# Patient Record
Sex: FEMALE | Race: OTHER | Employment: FULL TIME | URBAN - METROPOLITAN AREA
[De-identification: names, ages, dates, MRNs, and addresses within clinical notes are randomized per-mention and may not be internally consistent; named-entity substitution may affect disease eponyms.]

---

## 2017-11-24 ENCOUNTER — HOSPITAL ENCOUNTER (OUTPATIENT)
Dept: LAB | Age: 52
Discharge: HOME | End: 2017-11-24
Payer: COMMERCIAL

## 2017-11-24 LAB
ALBUMIN SERPL-MCNC: 3.7 G/DL (ref 3.4–5)
ALBUMIN/GLOB SERPL: 1.2 {RATIO} (ref 0.8–1.7)
ALP SERPL-CCNC: 108 U/L (ref 45–117)
ALT SERPL-CCNC: 30 U/L (ref 13–56)
ANION GAP SERPL CALC-SCNC: 7 MMOL/L (ref 3–18)
AST SERPL-CCNC: 17 U/L (ref 15–37)
BASOPHILS # BLD: 0 K/UL (ref 0–0.06)
BASOPHILS NFR BLD: 1 % (ref 0–2)
BILIRUB SERPL-MCNC: 0.3 MG/DL (ref 0.2–1)
BUN SERPL-MCNC: 15 MG/DL (ref 7–18)
BUN/CREAT SERPL: 19 (ref 12–20)
CALCIUM SERPL-MCNC: 9.2 MG/DL (ref 8.5–10.1)
CHLORIDE SERPL-SCNC: 103 MMOL/L (ref 100–108)
CHOLEST SERPL-MCNC: 162 MG/DL
CO2 SERPL-SCNC: 28 MMOL/L (ref 21–32)
CREAT SERPL-MCNC: 0.78 MG/DL (ref 0.6–1.3)
DIFFERENTIAL METHOD BLD: ABNORMAL
EOSINOPHIL # BLD: 0.1 K/UL (ref 0–0.4)
EOSINOPHIL NFR BLD: 1 % (ref 0–5)
ERYTHROCYTE [DISTWIDTH] IN BLOOD BY AUTOMATED COUNT: 13.9 % (ref 11.6–14.5)
GLOBULIN SER CALC-MCNC: 3.2 G/DL (ref 2–4)
GLUCOSE SERPL-MCNC: 94 MG/DL (ref 74–99)
HCT VFR BLD AUTO: 35.7 % (ref 35–45)
HDLC SERPL-MCNC: 57 MG/DL (ref 40–60)
HDLC SERPL: 2.8 {RATIO} (ref 0–5)
HGB BLD-MCNC: 11.8 G/DL (ref 12–16)
LDLC SERPL CALC-MCNC: 81.4 MG/DL (ref 0–100)
LIPID PROFILE,FLP: NORMAL
LYMPHOCYTES # BLD: 1.6 K/UL (ref 0.9–3.6)
LYMPHOCYTES NFR BLD: 25 % (ref 21–52)
MCH RBC QN AUTO: 27.6 PG (ref 24–34)
MCHC RBC AUTO-ENTMCNC: 33.1 G/DL (ref 31–37)
MCV RBC AUTO: 83.4 FL (ref 74–97)
MONOCYTES # BLD: 0.5 K/UL (ref 0.05–1.2)
MONOCYTES NFR BLD: 8 % (ref 3–10)
NEUTS SEG # BLD: 4.2 K/UL (ref 1.8–8)
NEUTS SEG NFR BLD: 65 % (ref 40–73)
PLATELET # BLD AUTO: 213 K/UL (ref 135–420)
PMV BLD AUTO: 9.9 FL (ref 9.2–11.8)
POTASSIUM SERPL-SCNC: 4 MMOL/L (ref 3.5–5.5)
PROT SERPL-MCNC: 6.9 G/DL (ref 6.4–8.2)
RBC # BLD AUTO: 4.28 M/UL (ref 4.2–5.3)
SODIUM SERPL-SCNC: 138 MMOL/L (ref 136–145)
TRIGL SERPL-MCNC: 118 MG/DL (ref ?–150)
VLDLC SERPL CALC-MCNC: 23.6 MG/DL
WBC # BLD AUTO: 6.4 K/UL (ref 4.6–13.2)

## 2017-11-24 PROCEDURE — 80061 LIPID PANEL: CPT | Performed by: FAMILY MEDICINE

## 2017-11-24 PROCEDURE — 84443 ASSAY THYROID STIM HORMONE: CPT | Performed by: FAMILY MEDICINE

## 2017-11-24 PROCEDURE — 80053 COMPREHEN METABOLIC PANEL: CPT | Performed by: FAMILY MEDICINE

## 2017-11-24 PROCEDURE — 36415 COLL VENOUS BLD VENIPUNCTURE: CPT | Performed by: FAMILY MEDICINE

## 2017-11-24 PROCEDURE — 85025 COMPLETE CBC W/AUTO DIFF WBC: CPT | Performed by: FAMILY MEDICINE

## 2017-11-28 LAB
FAX TO INFO,FAXT: NORMAL
FAX TO NUMBER,FAXN: NORMAL
TSH SERPL DL<=0.05 MIU/L-ACNC: 0.62 UIU/ML (ref 0.36–3.74)

## 2018-11-17 ENCOUNTER — HOSPITAL ENCOUNTER (OUTPATIENT)
Dept: LAB | Age: 53
Discharge: HOME | End: 2018-11-17
Payer: COMMERCIAL

## 2018-11-17 LAB
ANION GAP SERPL CALC-SCNC: 10 MMOL/L (ref 3–18)
BASOPHILS # BLD: 0 K/UL (ref 0–0.1)
BASOPHILS NFR BLD: 0 % (ref 0–2)
BUN SERPL-MCNC: 14 MG/DL (ref 7–18)
BUN/CREAT SERPL: 18
CALCIUM SERPL-MCNC: 8.9 MG/DL (ref 8.5–10.1)
CHLORIDE SERPL-SCNC: 106 MMOL/L (ref 100–108)
CO2 SERPL-SCNC: 24 MMOL/L (ref 21–32)
CREAT SERPL-MCNC: 0.8 MG/DL (ref 0.6–1.3)
DIFFERENTIAL METHOD BLD: NORMAL
EOSINOPHIL # BLD: 0.1 K/UL (ref 0–0.4)
EOSINOPHIL NFR BLD: 1 % (ref 0–5)
ERYTHROCYTE [DISTWIDTH] IN BLOOD BY AUTOMATED COUNT: 13.7 % (ref 11.6–14.5)
GLUCOSE SERPL-MCNC: 78 MG/DL (ref 74–99)
HCT VFR BLD AUTO: 38.5 % (ref 35–45)
HGB BLD-MCNC: 12.5 G/DL (ref 12–16)
LYMPHOCYTES # BLD: 1.3 K/UL (ref 0.9–3.6)
LYMPHOCYTES NFR BLD: 24 % (ref 21–52)
MCH RBC QN AUTO: 26.9 PG (ref 24–34)
MCHC RBC AUTO-ENTMCNC: 32.5 G/DL (ref 31–37)
MCV RBC AUTO: 82.8 FL (ref 74–97)
MONOCYTES # BLD: 0.5 K/UL (ref 0.05–1.2)
MONOCYTES NFR BLD: 9 % (ref 3–10)
NEUTS SEG # BLD: 3.7 K/UL (ref 1.8–8)
NEUTS SEG NFR BLD: 66 % (ref 40–73)
PLATELET # BLD AUTO: 243 K/UL (ref 135–420)
PMV BLD AUTO: 10.1 FL (ref 9.2–11.8)
POTASSIUM SERPL-SCNC: 3.7 MMOL/L (ref 3.5–5.5)
RBC # BLD AUTO: 4.65 M/UL (ref 4.2–5.3)
SODIUM SERPL-SCNC: 140 MMOL/L (ref 136–145)
WBC # BLD AUTO: 5.5 K/UL (ref 4.6–13.2)

## 2018-11-17 PROCEDURE — 80048 BASIC METABOLIC PNL TOTAL CA: CPT

## 2018-11-17 PROCEDURE — 84443 ASSAY THYROID STIM HORMONE: CPT

## 2018-11-17 PROCEDURE — 82607 VITAMIN B-12: CPT

## 2018-11-17 PROCEDURE — 82306 VITAMIN D 25 HYDROXY: CPT

## 2018-11-17 PROCEDURE — 83516 IMMUNOASSAY NONANTIBODY: CPT

## 2018-11-17 PROCEDURE — 85025 COMPLETE CBC W/AUTO DIFF WBC: CPT

## 2018-11-17 PROCEDURE — 36415 COLL VENOUS BLD VENIPUNCTURE: CPT

## 2018-11-17 PROCEDURE — 84439 ASSAY OF FREE THYROXINE: CPT

## 2018-11-18 LAB
25(OH)D3 SERPL-MCNC: 12.6 NG/ML (ref 30–100)
FOLATE SERPL-MCNC: >20 NG/ML (ref 3.1–17.5)
T4 FREE SERPL-MCNC: 0.9 NG/DL (ref 0.7–1.5)
TSH SERPL DL<=0.05 MIU/L-ACNC: 0.6 UIU/ML (ref 0.36–3.74)
VIT B12 SERPL-MCNC: 372 PG/ML (ref 211–911)

## 2018-11-21 LAB
ENDOMYSIUM IGA SER QL: NEGATIVE
GLIADIN PEPTIDE IGA SER-ACNC: 5 UNITS (ref 0–19)
GLIADIN PEPTIDE IGG SER-ACNC: 2 UNITS (ref 0–19)
IGA SERPL-MCNC: 448 MG/DL (ref 87–352)
TTG IGA SER-ACNC: <2 U/ML (ref 0–3)
TTG IGG SER-ACNC: <2 U/ML (ref 0–5)

## 2020-06-12 ENCOUNTER — HOSPITAL ENCOUNTER (OUTPATIENT)
Dept: PREADMISSION TESTING | Age: 55
Discharge: HOME | End: 2020-06-12
Payer: COMMERCIAL

## 2020-06-12 LAB
HCT VFR BLD AUTO: 36.6 % (ref 35–45)
HGB BLD-MCNC: 11.4 G/DL (ref 12–16)

## 2020-06-12 PROCEDURE — 36415 COLL VENOUS BLD VENIPUNCTURE: CPT

## 2020-06-12 PROCEDURE — 87635 SARS-COV-2 COVID-19 AMP PRB: CPT

## 2020-06-12 PROCEDURE — 85018 HEMOGLOBIN: CPT

## 2020-06-13 LAB — SARS-COV-2, COV2NT: NOT DETECTED

## 2020-06-17 NOTE — H&P (VIEW-ONLY)
Urology [address] [facility] [id number] Tel: [phone] Fax: [phone] Patient: Bryce Zimmerman YOB: 1965 Date: 05/27/2020 3:20 PM  
Visit Type: Consult Assessment/Plan # Detail Type Description 1. Assessment Urge incontinence (N39.41). Patient Plan Pt with significant urge incontinence issues, she has tried all medications with no improvement. She will undergo Axonics first stage implant in OR. All risks including pain infection bleeding need for 2nd stage procedure reviewed she understands and agrees 2. Assessment Urgency of urination (R39.15). 3. Assessment Frequency of micturition (R35.0). 4. Other Orders Orders not associated to today's assessments. Plan Orders The patient had the following order(s) completed today: UA Microscopic. Obtained on 05/27/2020, Interpretation: See module. This 50year old female presents for Overactive Bladder. History of Present Illness: 1. Overactive Bladder Onset was gradual. Severity level is moderate-severe. It occurs intermittently. The problem is worse. Associated symptoms include pelvic pain, pelvic pressure, urgency, urinary incontinence (mild LIDIA) and urinary frequency. Pertinent negatives include chills, constipation and fever. Additional information: Pt with a hx of urinary problems. I treated her with Myrbetriq 25 and was doing well. She is having worening of her symptoms. She is using 50mg and still having problems. She underwent  urodynamics revealing mixed incontinence. Her overactive bladder symptoms or worse then her stress incontinence. She is here today to undergo PNE.  
  
5/27/2020 Pt with hx of OAB and urge incontinence, she has undergone PNE previously, she had alot of sciatic pain.   I reviewed options and recommended she undergo first stage Axonics in OR and then we can determine if she has sciatica from implant. She is currently using Enablex and Oxytrol patches but they are ineffective PAST MEDICAL/SURGICAL HISTORY   (Detailed) Disease/disorder Onset Date Management Date Comments Foot Surgery 2016 Hysterectomy 2007 Laparoscopy 2004  section    
  bladder surgery GERD Esophagogastroduodenoscopy with possible biopsies and polypectomies as needed Thyroid disease Gastroparesis (NGES ) SIBO      
gastro-duodenitis at EGD Elevated lipids      
vit D defic GYNECOLOGIC HISTORY: 
Patient is postmenopausal.  
Type of menopause is hysterectomy . PROBLEM LIST:   Problem List reviewed. 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  
omeprazole 40 mg capsule,delayed release take 1 capsule by oral route  every day before a meal 2019 N  
NP Thyroid 60 mg tablet take 1 Tablet by Oral route  every day 2019 N Neurontin 300 mg capsule take 1 capsule by ORAL route 2 times every day 2019 N  
valacyclovir 500 mg tablet take 1 tablet by oral route  every day 12/10/2019   N  
hydrocodone 10 mg-chlorpheniramine 8 mg/5 mL oral susp extend. rel 12hr take 5 milliliter by oral route  every 12 hours 2020   N  
estradiol 0.0375 mg/24 hr weekly transdermal patch apply 1 patch by transdermal route  every week 2020  N  
omeprazole 40 mg capsule,delayed release take 1 capsule by oral route  every day before a meal 2020   N  
ergocalciferol (vitamin D2) 1,250 mcg (50,000 unit) capsule take 1 capsule by oral route  every week 2020   N Suprep Bowel Prep Kit 17.5 gram-3.13 gram-1.6 gram oral solution take the evening before and the morning of procedure as directed. 2020  N  
simvastatin 20 mg tablet TAKE ONE TABLET BY MOUTH EVERY DAY IN THE EVENING 2020 N  
metronidazole 500 mg tablet take 1 tablet by ORAL route 3 times every day 2020  N  
hydroxyzine HCl 50 mg tablet take 1 tablet by oral route 3 times every day 2020   N  
triamcinolone acetonide 0.1 % topical cream apply by TOPICAL route 3 times every day a thin film to the affected skin areas 2020   N Medication Reconciliation Medications reconciled today. Allergies Ingredient Reaction (Severity) Medication Name Comment CLINDAMYCIN     
CODEINE Unknown Reviewed, no changes. Family History  (Detailed) Relationship Family Member Name  Age at Death Condition Onset Age Cause of Death Maternal grandmother    Cancer, cervical  N Mother  N  Alive and well  N Mother    Alcoholism  N Social History:  (Detailed) Tobacco use reviewed. Preferred language is [place]. Born in [place]. MARITAL STATUS/FAMILY/SOCIAL SUPPORT Marital status: Single Tobacco use status: Never smoked tobacco. 
Smoking status: Never smoker. TOBACCO SCREENING: 
Patient has never used tobacco. Patient has not used tobacco in the last 30 days. Patient has not used smokeless tobacco in the last 30 days. SMOKING STATUS Type Smoking Status Usage Per Day Years Used Pack Years Total Pack Years Never smoker TOBACCO/VAPING EXPOSURE No passive smoke exposure. ALCOHOL There is no history of alcohol use. CAFFEINE The patient uses caffeine: coffee - 2 cups a day. LIFESTYLE 
Moderate activity level. Exercises 3-4 times a week. Review of Systems System Neg/Pos Details Constitutional Negative Chills and Fever. ENMT Negative Ear infections and Sore throat. Eyes Negative Blurred vision, Double vision and Eye pain. Respiratory Negative Asthma, Chronic cough, Dyspnea and Wheezing. Cardio Negative Chest pain. GI Negative Constipation, Decreased appetite, Diarrhea, Nausea and Vomiting. Endocrine Negative Cold intolerance, Heat intolerance, Increased thirst and Weight loss. Neuro Negative Headache and Tremors. Psych Negative Anxiety and Depression. Integumentary Negative Itching skin and Rash. MS Negative Back pain and Joint pain. Hema/Lymph Negative Easy bleeding. Vital Signs Gynecologic History Patient is postmenopausal.   
Height Time ft in cm Last Measured Height Position 3:41 PM 5.0 5.00 165.10 03/09/2020 0 Weight/BSA/BMI Time lb oz kg Context BMI kg/m2 BSA m2  
3:41 .00  71.668  26.29 Measured By Time Measured by  
3:41 PM [facility] Physical Exam 
Exam Findings Details Constitutional Normal No acute distress. Well nourished. Well developed. Head/Face Normal Facial features - Normal.  
Eyes Normal General - Right: Normal, Left: Normal.  
Neck Exam Normal Inspection - Normal.  
Respiratory Normal Inspection - Normal. Effort - Normal.  
Abdomen Normal Inspection - Normal. Appliance(s) - None. CVA tenderness - None. No abdominal tenderness. No hepatic enlargement. No spleen enlargement. No hernia. Genitourinary * Pelvic deferred. Genitourinary Normal No Suprapubic tenderness. No CVA tenderness. No Flank mass Musculoskeletal Normal Visual overview of all four extremities is normal. Gait - Normal.  
Extremity Normal No Edema. Neurological Normal Level of consciousness - Normal. Orientation - Normal. Memory - Normal.  
Psychiatric Normal Orientation - Oriented to time, place, person & situation. No agitation. Not anxious. Appropriate mood and affect. Immunizations Entered by History Date Immunization 11/9/2009 9:54:00 AM Flu Shot Medications (added, continued, or stopped today) Start Date Medication Directions PRN Status PRN Reason Instruction Stop Date  
03/09/2020 ergocalciferol (vitamin D2) 1,250 mcg (50,000 unit) capsule take 1 capsule by oral route  every week N     
02/03/2020 estradiol 0.0375 mg/24 hr weekly transdermal patch apply 1 patch by transdermal route  every week N   05/27/2020 01/02/2020 hydrocodone 10 mg-chlorpheniramine 8 mg/5 mL oral susp extend. rel 12hr take 5 milliliter by oral route  every 12 hours N     
05/28/2020 hydroquinone 4 % topical cream apply by topical route 2 times every day to the affected area(s) in the morning and at bedtime N     
04/06/2020 hydroxyzine HCl 50 mg tablet take 1 tablet by oral route 3 times every day N     
03/09/2020 metronidazole 500 mg tablet take 1 tablet by ORAL route 3 times every day N   05/27/2020 07/03/2019 Neurontin 300 mg capsule take 1 capsule by ORAL route 2 times every day N     
07/03/2019 NP Thyroid 60 mg tablet take 1 Tablet by Oral route  every day N     
07/03/2019 omeprazole 40 mg capsule,delayed release take 1 capsule by oral route  every day before a meal N     
02/27/2020 omeprazole 40 mg capsule,delayed release take 1 capsule by oral route  every day before a meal N     
03/09/2020 simvastatin 20 mg tablet TAKE ONE TABLET BY MOUTH EVERY DAY IN THE EVENING N   03/25/2021 03/09/2020 Suprep Bowel Prep Kit 17.5 gram-3.13 gram-1.6 gram oral solution take the evening before and the morning of procedure as directed. N   05/27/2020 04/06/2020 triamcinolone acetonide 0.1 % topical cream apply by TOPICAL route 3 times every day a thin film to the affected skin areas N     
12/10/2019 valacyclovir 500 mg tablet take 1 tablet by oral route  every day N Active Patient Care Team Members Name Contact Agency Type Support Role Relationship Active Date Inactive Date Specialty Ashu Franklin   encounter provider    Pulmonary Medicine Cailin Murillo   Patient provider PCP   Family Practice Cailin Murillo   primary care provider Huang Al   encounter provider Orquidea Packer   encounter provider    Gastroenterology Dhaval Orr   encounter provider    Urology Provider: Huang Al MD 05/27/2020 03:20 PM 
Document generated by:  Vanesa Chicas 06/02/2020 07:06 AM 
 
 
 
Electronically signed by Huang Al MD on 06/03/2020 07:35 PM

## 2020-06-17 NOTE — H&P
Urology [address] [place] [address]  Tel: [phone]  Fax: [phone]      Patient: Cyndee Camargo  YOB: 1965  Date: 05/27/2020 3:20 PM   Visit Type: Consult        Assessment/Plan  # Detail Type Description    1. Assessment Urge incontinence (N39.41). Patient Plan Pt with significant urge incontinence issues, she has tried all medications with no improvement. She will undergo Axonics first stage implant in OR. All risks including pain infection bleeding need for 2nd stage procedure reviewed she understands and agrees         2. Assessment Urgency of urination (R39.15). 3. Assessment Frequency of micturition (R35.0). 4. Other Orders Orders not associated to today's assessments. Plan Orders The patient had the following order(s) completed today: UA Microscopic. Obtained on 05/27/2020, Interpretation: See module. This 50year old female presents for Overactive Bladder. History of Present Illness:  1. Overactive Bladder   Onset was gradual. Severity level is moderate-severe. It occurs intermittently. The problem is worse. Associated symptoms include pelvic pain, pelvic pressure, urgency, urinary incontinence (mild LIDIA) and urinary frequency. Pertinent negatives include chills, constipation and fever. Additional information: Pt with a hx of urinary problems. I treated her with Myrbetriq 25 and was doing well. She is having worening of her symptoms. She is using 50mg and still having problems. She underwent  urodynamics revealing mixed incontinence. Her overactive bladder symptoms or worse then her stress incontinence. She is here today to undergo PNE.      5/27/2020  Pt with hx of OAB and urge incontinence, she has undergone PNE previously, she had alot of sciatic pain. I reviewed options and recommended she undergo first stage Axonics in OR and then we can determine if she has sciatica from implant.  She is currently using Enablex and Oxytrol patches but they are ineffective          PAST MEDICAL/SURGICAL HISTORY   (Detailed)    Disease/disorder Onset Date Management Date Comments     Foot Surgery 2016      Hysterectomy 2007      Laparoscopy 2004       section       bladder surgery     GERD         Esophagogastroduodenoscopy with possible biopsies and polypectomies as needed     Thyroid disease       Gastroparesis (NGES )       SIBO       gastro-duodenitis at EGD       Elevated lipids       vit D defic         GYNECOLOGIC HISTORY:  Patient is postmenopausal.   Type of menopause is hysterectomy . PROBLEM LIST:   Problem List reviewed.    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     Intestinal malabsorption 03/10/2020 N     Gastroesophageal reflux in child 03/10/2020 N     Abdominal bloating 03/10/2020 N     Erosive gastritis 03/10/2020 N     Central abdominal pain 03/10/2020 N     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 rel 12hr take 5 milliliter by oral route  every 12 hours 2020   N   estradiol 0.0375 mg/24 hr weekly transdermal patch apply 1 patch by transdermal route  every week 2020  N   omeprazole 40 mg capsule,delayed release take 1 capsule by oral route  every day before a meal 2020   N   ergocalciferol (vitamin D2) 1,250 mcg (50,000 unit) capsule take 1 capsule by oral route  every week 2020   N   Suprep Bowel Prep Kit 17.5 gram-3.13 gram-1.6 gram oral solution take the evening before and the morning of procedure as directed. 2020  N   simvastatin 20 mg tablet TAKE ONE TABLET BY MOUTH EVERY DAY IN THE EVENING 2020 N   metronidazole 500 mg tablet take 1 tablet by ORAL route 3 times every day 2020  N   hydroxyzine HCl 50 mg tablet take 1 tablet by oral route 3 times every day 2020   N   triamcinolone acetonide 0.1 % topical cream apply by TOPICAL route 3 times every day a thin film to the affected skin areas 2020   N     Medication Reconciliation  Medications reconciled today. Allergies  Ingredient Reaction (Severity) Medication Name Comment   CLINDAMYCIN      CODEINE Unknown       Reviewed, no changes. Family History  (Detailed)  Relationship Family Member Name  Age at Death Condition Onset Age Cause of Death   Maternal grandmother    Cancer, cervical  N   Mother  N  Alive and well  N   Mother    Alcoholism  N         Social History:  (Detailed)  Tobacco use reviewed. Preferred language is [place]. Born in [place]. MARITAL STATUS/FAMILY/SOCIAL SUPPORT  Marital status: Single   Tobacco use status: Never smoked tobacco.  Smoking status: Never smoker. TOBACCO SCREENING:  Patient has never used tobacco. Patient has not used tobacco in the last 30 days. Patient has not used smokeless tobacco in the last 30 days.     SMOKING STATUS  Type Smoking Status Usage Per Day Years Used Pack Years Total Pack Years    Never smoker         TOBACCO/VAPING EXPOSURE  No passive smoke exposure. ALCOHOL  There is no history of alcohol use. CAFFEINE  The patient uses caffeine: coffee - 2 cups a day. LIFESTYLE  Moderate activity level. Exercises 3-4 times a week. Review of Systems  System Neg/Pos Details   Constitutional Negative Chills and Fever. ENMT Negative Ear infections and Sore throat. Eyes Negative Blurred vision, Double vision and Eye pain. Respiratory Negative Asthma, Chronic cough, Dyspnea and Wheezing. Cardio Negative Chest pain. GI Negative Constipation, Decreased appetite, Diarrhea, Nausea and Vomiting. Endocrine Negative Cold intolerance, Heat intolerance, Increased thirst and Weight loss. Neuro Negative Headache and Tremors. Psych Negative Anxiety and Depression. Integumentary Negative Itching skin and Rash. MS Negative Back pain and Joint pain. Hema/Lymph Negative Easy bleeding. Vital Signs   Gynecologic History  Patient is postmenopausal.    Height  Time ft in cm Last Measured Height Position   3:41 PM 5.0 5.00 165.10 03/09/2020 0   Weight/BSA/BMI  Time lb oz kg Context BMI kg/m2 BSA m2   3:41 .00  71.668  26.29    Measured By  Time Measured by   3:41 PM [facility]     Physical Exam  Exam Findings Details   Constitutional Normal No acute distress. Well nourished. Well developed. Head/Face Normal Facial features - Normal.   Eyes Normal General - Right: Normal, Left: Normal.   Neck Exam Normal Inspection - Normal.   Respiratory Normal Inspection - Normal. Effort - Normal.   Abdomen Normal Inspection - Normal. Appliance(s) - None. CVA tenderness - None. No abdominal tenderness. No hepatic enlargement. No spleen enlargement. No hernia. Genitourinary * Pelvic deferred. Genitourinary Normal No Suprapubic tenderness. No CVA tenderness.  No Flank mass   Musculoskeletal Normal Visual overview of all four extremities is normal. Gait - Normal.   Extremity Normal No Edema. Neurological Normal Level of consciousness - Normal. Orientation - Normal. Memory - Normal.   Psychiatric Normal Orientation - Oriented to time, place, person & situation. No agitation. Not anxious. Appropriate mood and affect. Immunizations Entered by History    Date Immunization   11/9/2009 9:54:00 AM Flu Shot       Medications (added, continued, or stopped today)  Start Date Medication Directions PRN Status PRN Reason Instruction Stop Date   03/09/2020 ergocalciferol (vitamin D2) 1,250 mcg (50,000 unit) capsule take 1 capsule by oral route  every week N      02/03/2020 estradiol 0.0375 mg/24 hr weekly transdermal patch apply 1 patch by transdermal route  every week N   05/27/2020 01/02/2020 hydrocodone 10 mg-chlorpheniramine 8 mg/5 mL oral susp extend. rel 12hr take 5 milliliter by oral route  every 12 hours N      05/28/2020 hydroquinone 4 % topical cream apply by topical route 2 times every day to the affected area(s) in the morning and at bedtime N      04/06/2020 hydroxyzine HCl 50 mg tablet take 1 tablet by oral route 3 times every day N      03/09/2020 metronidazole 500 mg tablet take 1 tablet by ORAL route 3 times every day N   05/27/2020 07/03/2019 Neurontin 300 mg capsule take 1 capsule by ORAL route 2 times every day N      07/03/2019 NP Thyroid 60 mg tablet take 1 Tablet by Oral route  every day N      07/03/2019 omeprazole 40 mg capsule,delayed release take 1 capsule by oral route  every day before a meal N      02/27/2020 omeprazole 40 mg capsule,delayed release take 1 capsule by oral route  every day before a meal N      03/09/2020 simvastatin 20 mg tablet TAKE ONE TABLET BY MOUTH EVERY DAY IN THE EVENING N   03/25/2021 03/09/2020 Suprep Bowel Prep Kit 17.5 gram-3.13 gram-1.6 gram oral solution take the evening before and the morning of procedure as directed.  N   05/27/2020 04/06/2020 triamcinolone acetonide 0.1 % topical cream apply by TOPICAL route 3 times every day a thin film to the affected skin areas N      12/10/2019 valacyclovir 500 mg tablet take 1 tablet by oral route  every day N      Active Patient Care Team Members    Name Contact Agency Type Support Role Relationship Active Date Inactive Date Specialty   Vern Bound   encounter provider    Pulmonary Medicine   Cailin Murillo   Patient provider PCP   Family Practice   Cailin Murillo   primary care provider       Mallorie Vinson   encounter provider       Buster Maher   encounter provider    Gastroenterology   Catrina Peterson   encounter provider    Urology       Provider: Mallorie Vinson MD 05/27/2020 03:20 PM  Document generated by:  Bry Chicas 06/02/2020 07:06 AM        Electronically signed by Mallorie Vinson MD on 06/03/2020 07:35 PM

## 2020-06-18 ENCOUNTER — ANESTHESIA EVENT (OUTPATIENT)
Dept: SURGERY | Age: 55
End: 2020-06-18
Payer: COMMERCIAL

## 2020-06-18 ENCOUNTER — APPOINTMENT (OUTPATIENT)
Dept: GENERAL RADIOLOGY | Age: 55
End: 2020-06-18
Attending: UROLOGY
Payer: COMMERCIAL

## 2020-06-18 ENCOUNTER — HOSPITAL ENCOUNTER (OUTPATIENT)
Age: 55
Setting detail: OUTPATIENT SURGERY
Discharge: HOME | End: 2020-06-18
Attending: UROLOGY | Admitting: UROLOGY
Payer: COMMERCIAL

## 2020-06-18 ENCOUNTER — ANESTHESIA (OUTPATIENT)
Dept: SURGERY | Age: 55
End: 2020-06-18
Payer: COMMERCIAL

## 2020-06-18 VITALS
DIASTOLIC BLOOD PRESSURE: 85 MMHG | TEMPERATURE: 97.4 F | OXYGEN SATURATION: 100 % | BODY MASS INDEX: 27.54 KG/M2 | SYSTOLIC BLOOD PRESSURE: 169 MMHG | WEIGHT: 165.31 LBS | HEART RATE: 70 BPM | RESPIRATION RATE: 15 BRPM | HEIGHT: 65 IN

## 2020-06-18 PROCEDURE — 74011000250 HC RX REV CODE- 250: Performed by: UROLOGY

## 2020-06-18 PROCEDURE — 77030018842 HC SOL IRR SOD CL 9% BAXT -A: Performed by: UROLOGY

## 2020-06-18 PROCEDURE — 77030020268 HC MISC GENERAL SUPPLY: Performed by: UROLOGY

## 2020-06-18 PROCEDURE — 74011250636 HC RX REV CODE- 250/636: Performed by: UROLOGY

## 2020-06-18 PROCEDURE — 74011000250 HC RX REV CODE- 250: Performed by: ANESTHESIOLOGY

## 2020-06-18 PROCEDURE — 77030020782 HC GWN BAIR PAWS FLX 3M -B: Performed by: UROLOGY

## 2020-06-18 PROCEDURE — 74011000272 HC RX REV CODE- 272: Performed by: UROLOGY

## 2020-06-18 PROCEDURE — C1883 ADAPT/EXT, PACING/NEURO LEAD: HCPCS | Performed by: UROLOGY

## 2020-06-18 PROCEDURE — 76210000021 HC REC RM PH II 0.5 TO 1 HR: Performed by: UROLOGY

## 2020-06-18 PROCEDURE — 76060000034 HC ANESTHESIA 1.5 TO 2 HR: Performed by: UROLOGY

## 2020-06-18 PROCEDURE — 74011250636 HC RX REV CODE- 250/636: Performed by: ANESTHESIOLOGY

## 2020-06-18 PROCEDURE — 76010000153 HC OR TIME 1.5 TO 2 HR: Performed by: UROLOGY

## 2020-06-18 PROCEDURE — C1778 LEAD, NEUROSTIMULATOR: HCPCS | Performed by: UROLOGY

## 2020-06-18 PROCEDURE — 77030034475 HC MISC IMPL SPN: Performed by: UROLOGY

## 2020-06-18 PROCEDURE — 77030002888 HC SUT CHRMC J&J -A: Performed by: UROLOGY

## 2020-06-18 PROCEDURE — 77030031139 HC SUT VCRL2 J&J -A: Performed by: UROLOGY

## 2020-06-18 DEVICE — PERCUTANEOUS EXTENSION
Type: IMPLANTABLE DEVICE | Site: BACK | Status: FUNCTIONAL
Brand: AXONICS

## 2020-06-18 DEVICE — TINED LEAD KIT
Type: IMPLANTABLE DEVICE | Site: BACK | Status: FUNCTIONAL
Brand: AXONICS

## 2020-06-18 RX ORDER — CEFAZOLIN SODIUM 2 G/50ML
2 SOLUTION INTRAVENOUS ONCE
Status: COMPLETED | OUTPATIENT
Start: 2020-06-18 | End: 2020-06-18

## 2020-06-18 RX ORDER — LIDOCAINE HYDROCHLORIDE 20 MG/ML
INJECTION, SOLUTION EPIDURAL; INFILTRATION; INTRACAUDAL; PERINEURAL AS NEEDED
Status: DISCONTINUED | OUTPATIENT
Start: 2020-06-18 | End: 2020-06-18 | Stop reason: HOSPADM

## 2020-06-18 RX ORDER — BUPIVACAINE HYDROCHLORIDE 2.5 MG/ML
INJECTION, SOLUTION EPIDURAL; INFILTRATION; INTRACAUDAL AS NEEDED
Status: DISCONTINUED | OUTPATIENT
Start: 2020-06-18 | End: 2020-06-18 | Stop reason: HOSPADM

## 2020-06-18 RX ORDER — SODIUM CHLORIDE, SODIUM LACTATE, POTASSIUM CHLORIDE, CALCIUM CHLORIDE 600; 310; 30; 20 MG/100ML; MG/100ML; MG/100ML; MG/100ML
125 INJECTION, SOLUTION INTRAVENOUS CONTINUOUS
Status: DISCONTINUED | OUTPATIENT
Start: 2020-06-18 | End: 2020-06-18 | Stop reason: HOSPADM

## 2020-06-18 RX ORDER — FENTANYL CITRATE 50 UG/ML
INJECTION, SOLUTION INTRAMUSCULAR; INTRAVENOUS AS NEEDED
Status: DISCONTINUED | OUTPATIENT
Start: 2020-06-18 | End: 2020-06-18 | Stop reason: HOSPADM

## 2020-06-18 RX ORDER — METOCLOPRAMIDE HYDROCHLORIDE 5 MG/ML
INJECTION INTRAMUSCULAR; INTRAVENOUS AS NEEDED
Status: DISCONTINUED | OUTPATIENT
Start: 2020-06-18 | End: 2020-06-18 | Stop reason: HOSPADM

## 2020-06-18 RX ORDER — MIDAZOLAM HYDROCHLORIDE 1 MG/ML
INJECTION, SOLUTION INTRAMUSCULAR; INTRAVENOUS AS NEEDED
Status: DISCONTINUED | OUTPATIENT
Start: 2020-06-18 | End: 2020-06-18 | Stop reason: HOSPADM

## 2020-06-18 RX ORDER — PROPOFOL 10 MG/ML
INJECTION, EMULSION INTRAVENOUS
Status: DISCONTINUED | OUTPATIENT
Start: 2020-06-18 | End: 2020-06-18 | Stop reason: HOSPADM

## 2020-06-18 RX ORDER — LIDOCAINE HYDROCHLORIDE 10 MG/ML
INJECTION INFILTRATION; PERINEURAL AS NEEDED
Status: DISCONTINUED | OUTPATIENT
Start: 2020-06-18 | End: 2020-06-18 | Stop reason: HOSPADM

## 2020-06-18 RX ORDER — KETAMINE HYDROCHLORIDE 10 MG/ML
INJECTION, SOLUTION INTRAMUSCULAR; INTRAVENOUS AS NEEDED
Status: DISCONTINUED | OUTPATIENT
Start: 2020-06-18 | End: 2020-06-18 | Stop reason: HOSPADM

## 2020-06-18 RX ADMIN — CEFAZOLIN SODIUM 2 G: 2 SOLUTION INTRAVENOUS at 09:45

## 2020-06-18 RX ADMIN — SODIUM CHLORIDE, SODIUM LACTATE, POTASSIUM CHLORIDE, AND CALCIUM CHLORIDE: 600; 310; 30; 20 INJECTION, SOLUTION INTRAVENOUS at 09:31

## 2020-06-18 RX ADMIN — SODIUM CHLORIDE, SODIUM LACTATE, POTASSIUM CHLORIDE, AND CALCIUM CHLORIDE: 600; 310; 30; 20 INJECTION, SOLUTION INTRAVENOUS at 10:30

## 2020-06-18 RX ADMIN — LIDOCAINE HYDROCHLORIDE 60 MG: 20 INJECTION, SOLUTION EPIDURAL; INFILTRATION; INTRACAUDAL; PERINEURAL at 09:44

## 2020-06-18 RX ADMIN — PROPOFOL 75 MCG/KG/MIN: 10 INJECTION, EMULSION INTRAVENOUS at 09:48

## 2020-06-18 RX ADMIN — METOCLOPRAMIDE 10 MG: 5 INJECTION, SOLUTION INTRAMUSCULAR; INTRAVENOUS at 09:44

## 2020-06-18 RX ADMIN — KETAMINE HYDROCHLORIDE 10 MG: 10 INJECTION, SOLUTION INTRAMUSCULAR; INTRAVENOUS at 10:39

## 2020-06-18 RX ADMIN — SODIUM CHLORIDE, SODIUM LACTATE, POTASSIUM CHLORIDE, AND CALCIUM CHLORIDE 125 ML/HR: 600; 310; 30; 20 INJECTION, SOLUTION INTRAVENOUS at 08:10

## 2020-06-18 RX ADMIN — KETAMINE HYDROCHLORIDE 20 MG: 10 INJECTION, SOLUTION INTRAMUSCULAR; INTRAVENOUS at 09:41

## 2020-06-18 RX ADMIN — MIDAZOLAM 2 MG: 1 INJECTION INTRAMUSCULAR; INTRAVENOUS at 09:31

## 2020-06-18 RX ADMIN — FENTANYL CITRATE 100 MCG: 50 INJECTION, SOLUTION INTRAMUSCULAR; INTRAVENOUS at 09:39

## 2020-06-18 RX ADMIN — KETAMINE HYDROCHLORIDE 10 MG: 10 INJECTION, SOLUTION INTRAMUSCULAR; INTRAVENOUS at 10:43

## 2020-06-18 RX ADMIN — KETAMINE HYDROCHLORIDE 10 MG: 10 INJECTION, SOLUTION INTRAMUSCULAR; INTRAVENOUS at 09:51

## 2020-06-18 NOTE — INTERVAL H&P NOTE
Update History & Physical    The Patient's History and Physical of May 27,   2020 was reviewed with the patient and I examined the patient. There was no change. The surgical site was confirmed by the patient and me. Plan:  The risk, benefits, expected outcome, and alternative to the recommended procedure have been discussed with the patient. Patient understands and wants to proceed with the procedure.     Electronically signed by Joseluis Bernal MD on 6/18/2020 at 9:30 AM

## 2020-06-18 NOTE — ANESTHESIA PREPROCEDURE EVALUATION
Relevant Problems   No relevant active problems       Anesthetic History   No history of anesthetic complications            Review of Systems / Medical History  Patient summary reviewed and pertinent labs reviewed    Pulmonary  Within defined limits                 Neuro/Psych   Within defined limits           Cardiovascular  Within defined limits                Exercise tolerance: >4 METS     GI/Hepatic/Renal     GERD: well controlled        Pertinent negatives: No hiatal hernia  Comments: History of gastroparesis, tends to be episodic. None currently for a couple months. Reports no early satiation; hungry now.    Endo/Other      Hypothyroidism: well controlled       Other Findings            Physical Exam    Airway  Mallampati: II  TM Distance: 4 - 6 cm  Neck ROM: normal range of motion   Mouth opening: Normal     Cardiovascular    Rhythm: regular  Rate: normal         Dental  No notable dental hx       Pulmonary  Breath sounds clear to auscultation               Abdominal  GI exam deferred       Other Findings            Anesthetic Plan    ASA: 2  Anesthesia type: MAC          Induction: Intravenous  Anesthetic plan and risks discussed with: Patient

## 2020-06-18 NOTE — PERIOP NOTES
Discharge instructions reviewed with Daughter Candice Samuel via telephone. No questions or concerns at this time.

## 2020-06-18 NOTE — DISCHARGE INSTRUCTIONS
Selena Rodriguez. Erick Ferrell M.D. [facility]  [address], [address] [place], [address]  Office: [phone]  Fax:    [phone]: Procedure(s):  [address] Urology IMMEDIATELY if any of the following occur:     You are unable to urinate. Urgency to urinate is not uncommon.  You find yourself urinating small frequent amounts associated with severe lower abdominal discomfort.  Bright red blood with clots in the urine. Some reddish urine is not uncommon and should be treated with increasing the amount of fluids you drink.  Temperature above 101.5° and / or chills.  You are nauseous and / or vomiting and you cannot hold down any fluids.  Your pain is not controlled with the pain medication prescribed. Special Considerations:      Do not drive for at least 24 hours after the procedure and until you are no longer taking narcotic pain medication and you are able to move and react without hesitation. MEDICATIONS:  Pain   []  Norco®   []  Percocet® []  Dilaudid®    [x]  Tramadol   Antibiotics   []  Cipro   [x]  Keflex    [] Levaquin   []  Bactrim DS®       Urination   []  Vesicare®   []  Flomax     Burning   []  Pyridium®   []  UribelTM     Nausea   []  Zofran®   []  Phenergan®     Miscellaneous   []           [] Prescriptions Written on Chart    [x] Prescriptions sent Electronically           Our office will call you tomorrow to schedule your first follow-up appointment. Please contact [facility], [facility]. Urology at [phone] or go to the nearest Emergency Department / Urgent Care facility for any other medical questions or concerns. Patient armband removed and shredded  Learning About Coronavirus (COVID-19)  Coronavirus (COVID-19): Overview  What is coronavirus (MOPAV-90)? The coronavirus disease (COVID-19) is caused by a virus. It is an illness that was first found in [place], [place], in December 2019. It has since spread worldwide.   The virus can cause fever, cough, and trouble breathing. In severe cases, it can cause pneumonia and make it hard to breathe without help. It can cause death. Coronaviruses are a large group of viruses. They cause the common cold. They also cause more serious illnesses like Middle East respiratory syndrome (MERS) and severe acute respiratory syndrome (SARS). COVID-19 is caused by a novel coronavirus. That means it's a new type that has not been seen in people before. This virus spreads person-to-person through droplets from coughing and sneezing. It can also spread when you are close to someone who is infected. And it can spread when you touch something that has the virus on it, such as a doorknob or a tabletop. What can you do to protect yourself from coronavirus (COVID-19)? The best way to protect yourself from getting sick is to:  · Avoid areas where there is an outbreak. · Avoid contact with people who may be infected. · Wash your hands often with soap or alcohol-based hand sanitizers. · Avoid crowds and try to stay at least 6 feet away from other people. · Wash your hands often, especially after you cough or sneeze. Use soap and water, and scrub for at least 20 seconds. If soap and water aren't available, use an alcohol-based hand . · Avoid touching your mouth, nose, and eyes. What can you do to avoid spreading the virus to others? To help avoid spreading the virus to others:  · Cover your mouth with a tissue when you cough or sneeze. Then throw the tissue in the trash. · Use a disinfectant to clean things that you touch often. · Stay home if you are sick or have been exposed to the virus. Don't go to school, work, or public areas. And don't use public transportation. · If you are sick:  ? Leave your home only if you need to get medical care. But call the doctor's office first so they know you're coming.  And wear a face mask, if you have one.  ? If you have a face mask, wear it whenever you're around other people. It can help stop the spread of the virus when you cough or sneeze. ? Clean and disinfect your home every day. Use household  and disinfectant wipes or sprays. Take special care to clean things that you grab with your hands. These include doorknobs, remote controls, phones, and handles on your refrigerator and microwave. And don't forget countertops, tabletops, bathrooms, and computer keyboards. When to call for help  Call 911 anytime you think you may need emergency care. For example, call if:  · You have severe trouble breathing. (You can't talk at all.)  · You have constant chest pain or pressure. · You are severely dizzy or lightheaded. · You are confused or can't think clearly. · Your face and lips have a blue color. · You pass out (lose consciousness) or are very hard to wake up. Call your doctor now if you develop symptoms such as:  · Shortness of breath. · Fever. · Cough. If you need to get care, call ahead to the doctor's office for instructions before you go. Make sure you wear a face mask, if you have one, to prevent exposing other people to the virus. Where can you get the latest information? The following health organizations are tracking and studying this virus. Their websites contain the most up-to-date information. Elyse Ellington also learn what to do if you think you may have been exposed to the virus. · U.S. Centers for Disease Control and Prevention (CDC): The CDC provides updated news about the disease and travel advice. The website also tells you how to prevent the spread of infection. www.cdc.gov  · World Health Organization [facility]): WHO offers information about the virus outbreaks. WHO also has travel advice. www.who.int  Current as of: April 1, 2020               Content Version: 12.4  © 0455-0138 [employer], [employer]orporated.    Care instructions adapted under license by your healthcare professional. If you have questions about a medical condition or this instruction, always ask your healthcare professional. [place] [address] any warranty or liability for your use of this information. DISCHARGE SUMMARY from Nurse    PATIENT INSTRUCTIONS:    After general anesthesia or intravenous sedation, for 24 hours or while taking prescription Narcotics:  · Limit your activities  · Do not drive and operate hazardous machinery  · Do not make important personal or business decisions  · Do  not drink alcoholic beverages  · If you have not urinated within 8 hours after discharge, please contact your surgeon on call. Report the following to your surgeon:  · Excessive pain, swelling, redness or odor of or around the surgical area  · Temperature over 100.5  · Nausea and vomiting lasting longer than 4 hours or if unable to take medications  · Any signs of decreased circulation or nerve impairment to extremity: change in color, persistent  numbness, tingling, coldness or increase pain  · Any questions    What to do at Home:  Recommended activity: Activity as tolerated and no driving for today and Ambulate in house,     If you experience any of the following symptoms above, please follow up with Dr. Ramón Castillo. *  Please give a list of your current medications to your Primary Care Provider. *  Please update this list whenever your medications are discontinued, doses are      changed, or new medications (including over-the-counter products) are added. *  Please carry medication information at all times in case of emergency situations. These are general instructions for a healthy lifestyle:    No smoking/ No tobacco products/ Avoid exposure to second hand smoke  Surgeon General's Warning:  Quitting smoking now greatly reduces serious risk to your health.     Obesity, smoking, and sedentary lifestyle greatly increases your risk for illness    A healthy diet, regular physical exercise & weight monitoring are important for maintaining a healthy lifestyle    You may be retaining fluid if you have a history of heart failure or if you experience any of the following symptoms:  Weight gain of 3 pounds or more overnight or 5 pounds in a week, increased swelling in our hands or feet or shortness of breath while lying flat in bed. Please call your doctor as soon as you notice any of these symptoms; do not wait until your next office visit. The discharge information has been reviewed with the patient and caregiver. The patient and caregiver verbalized understanding. Discharge medications reviewed with the patient and caregiver and appropriate educational materials and side effects teaching were provided.   ___________________________________________________________________________________________________________________________________

## 2020-06-18 NOTE — PERIOP NOTES
Reviewed PTA medication list with patient/caregiver and patient/caregiver denies any additional medications. Patient admits to having a responsible adult care for them at home for at least 24 hours after surgery. Patient encouraged to use gown warming system and informed that using said warming gown to regulate body temperature prior to a procedure has been shown to help reduce the risks of blood clots and infection. Dual skin assessment & fall risk band verification completed with Cristino Shah RN.

## 2020-06-18 NOTE — ANESTHESIA POSTPROCEDURE EVALUATION
Procedure(s):  AXONICS I STAGE IMPLANT WITH C-ARM. MAC    Anesthesia Post Evaluation      Multimodal analgesia: multimodal analgesia used between 6 hours prior to anesthesia start to PACU discharge  Patient location during evaluation: PACU  Patient participation: complete - patient participated  Level of consciousness: awake  Pain management: adequate  Airway patency: patent  Anesthetic complications: no  Cardiovascular status: acceptable  Respiratory status: acceptable  Hydration status: acceptable  Post anesthesia nausea and vomiting:  none  Final Post Anesthesia Temperature Assessment:  Normothermia (36.0-37.5 degrees C)      INITIAL Post-op Vital signs:   Vitals Value Taken Time   /85 6/18/2020 11:48 AM   Temp     Pulse 70 6/18/2020 11:54 AM   Resp     SpO2 100 % 6/18/2020 11:54 AM   Vitals shown include unvalidated device data.

## 2020-06-18 NOTE — BRIEF OP NOTE
Brief Postoperative Note    Patient: Kevin Cunningham  YOB: 1965  MRN: [id number]    Date of Procedure: 6/18/2020     Pre-Op Diagnosis: URGE INCONTINENCE    Post-Op Diagnosis: Same as preoperative diagnosis. Procedure(s):  AXONICS I STAGE IMPLANT WITH C-ARM    Surgeon(s):  Erinn Acevedo MD    Surgical Assistant: None    Anesthesia: MAC     Estimated Blood Loss (mL): Minimal    Complications: None    Specimens: * No specimens in log *     Implants:   Implant Name Type Inv.  Item Serial No.  Lot No. LRB No. Used Action   AXONICS SNM SYSTEM TINED LEAD   [id number]   Right 1 Implanted   AXONICS SNM SYSTEM PERCUTANEOUS EXTENSION   [id number]   Right 1 Implanted       Drains: * No LDAs found *    Findings: Urge incontinence    Electronically Signed by Huang Al MD on 6/18/2020 at 10:58 AM

## 2020-06-19 ENCOUNTER — HOSPITAL ENCOUNTER (OUTPATIENT)
Dept: PREADMISSION TESTING | Age: 55
Discharge: HOME | End: 2020-06-19
Payer: COMMERCIAL

## 2020-06-19 PROCEDURE — 87635 SARS-COV-2 COVID-19 AMP PRB: CPT

## 2020-06-19 NOTE — OP NOTES
Postoperative Note     Patient: Marco Astudillo  YOB: 1965  MRN: [id number]     Date of Procedure: 6/18/2020      Pre-Op Diagnosis: URGE INCONTINENCE     Post-Op Diagnosis: Same as preoperative diagnosis.       Procedure(s):  AXONICS I STAGE IMPLANT WITH C-ARM     Surgeon(s):  Maximiliano Estes MD     Surgical Assistant: None     Anesthesia: MAC      Estimated Blood Loss (mL): Minimal     Complications: None     Specimens: * No specimens in log *      Implants:   Implant Name Type Inv. Item Serial No.  Lot No. LRB No. Used Action   AXONICS SNM SYSTEM TINED LEAD     [id number]     Right 1 Implanted   AXONICS SNM SYSTEM PERCUTANEOUS EXTENSION     [id number]     Right 1 Implanted         Drains: * No LDAs found *     Findings: Urge incontinence    After informed consent was obtained, the patient was taken to the operating room, placed in the prone position on the [facility] table. The pt was prepped and draped in usual surgical fashion. Tape was placed on each buttock and pulled laterally to help in visualization of the anal sphincter. A  was used to confirm the patient was level. The patient was then prepped and draped in usual surgical fashion. The C-arm was moved into the lateral position to image the area of the sacral promontory to the coccyx. We then obtained an AP view to identify the superior medial aspect of S3. This was then marked on the skin level on the LEFT side. Next, the skin was anesthetized with 0.25% Marcaine:1% Lidocaine without epinephrine. The anesthetic was placed at the skin insertion point and at the periosteal level for the lead insertion, as well as preparing the pocket for the neurostimulator. A foramen needle was then inserted at the marked position parallel to the foraminal line. It entered the S3 foramen on the LEFT side without difficulty. Depth of the needle was confirmed with lateral fluoroscopy.  Proper needle position was confirmed by direct observation of lifting of the perineum or \"bellowing\" and the observance of plantar flexion of the great toe using the test stimulator box. The needle stylet was removed, and a directional guide was placed, confirming its placement and depth on fluoroscopy. The foramen needle was then removed. An incision was made laterally from the directional guide through the skin, and then a dilator and introducer sheath was placed over the directional guide and directed into the foramen, until the opaque marker of the dilator was seen at the midline the of the sacrum. The dilator obturator was unlocked and removed along with the directional guide. The tined lead with a curved stylet, was then placed through the introducer sheath so that lead 2 and 3 straddled the anterior margin of the bone. Position was confirmed by fluoroscopy. The lead was then further introduced, until three electrodes were visible below the sacrum, and one electrode within the sacrum. Each electrode was tested for visualization of axel and plantar flexion of the great toe. We had good responses on all four leads both plantar flexion and axel response. After satisfactory positioning was confirmed both AP and lateral views, the introducer sheath was retracted under continuous fluoroscopy, deploying the tined leads into the presacral tissue. Leads were then retested to confirm appropriate motor response. A tunneling tool with an overlying plastic sheath was inserted from the lead insertion site  subcutaneously to the new INS pocketsite which had been previously marked to be located  in the hollow of the ileum. After administration of local anesthetic, a 2.5cm incision was made  lateral to the site of INS placement site, to a depth no greater than 2cm deep to the skin. A  pocket was created to accommodate the INS using combination of sharp and blunt  dissection, maintaining the depth of 2 cm.  The tunneling tool was passed from the lead wire  insertion site to the lateral pocket. The sharp tip of the tunneling tool was removed and the  lead was fed through the sheath, exiting at the pocket site. The  lead was cleaned and dried and connected to the extender for the first stage. The single set screw was tightened using the torque wrench  The extension lead was then tunneled medially and brought out of the skin to be connected to the temporary lead. Any excessive lead was coiled and placed in the permanent pocket. .The incisions were irrigated with antibiotic solution in sterile water and the INS incision was  closed with 2-0 Vicryl in a running fashion. Adhesive strips and gauze were placed over the incision and covered  with transparent dressing. The patient was then awoken and placed on to the stretcher in supine position. Transferred to recovery room stable condition.

## 2020-06-20 LAB — SARS-COV-2, COV2NT: NOT DETECTED

## 2020-06-25 ENCOUNTER — ANESTHESIA (OUTPATIENT)
Dept: SURGERY | Age: 55
End: 2020-06-25
Payer: COMMERCIAL

## 2020-06-25 ENCOUNTER — ANESTHESIA EVENT (OUTPATIENT)
Dept: SURGERY | Age: 55
End: 2020-06-25
Payer: COMMERCIAL

## 2020-06-25 ENCOUNTER — HOSPITAL ENCOUNTER (OUTPATIENT)
Age: 55
Setting detail: OUTPATIENT SURGERY
Discharge: HOME | End: 2020-06-25
Attending: UROLOGY | Admitting: UROLOGY
Payer: COMMERCIAL

## 2020-06-25 VITALS
SYSTOLIC BLOOD PRESSURE: 164 MMHG | RESPIRATION RATE: 16 BRPM | TEMPERATURE: 96.9 F | WEIGHT: 165.19 LBS | BODY MASS INDEX: 27.52 KG/M2 | OXYGEN SATURATION: 100 % | HEIGHT: 65 IN | HEART RATE: 68 BPM | DIASTOLIC BLOOD PRESSURE: 92 MMHG

## 2020-06-25 PROCEDURE — 74011250636 HC RX REV CODE- 250/636: Performed by: UROLOGY

## 2020-06-25 PROCEDURE — C1820 GENERATOR NEURO RECHG BAT SY: HCPCS | Performed by: UROLOGY

## 2020-06-25 PROCEDURE — 74011000272 HC RX REV CODE- 272: Performed by: UROLOGY

## 2020-06-25 PROCEDURE — 77030002888 HC SUT CHRMC J&J -A: Performed by: UROLOGY

## 2020-06-25 PROCEDURE — 77030018842 HC SOL IRR SOD CL 9% BAXT -A: Performed by: UROLOGY

## 2020-06-25 PROCEDURE — 74011250636 HC RX REV CODE- 250/636: Performed by: NURSE ANESTHETIST, CERTIFIED REGISTERED

## 2020-06-25 PROCEDURE — 77030040361 HC SLV COMPR DVT MDII -B: Performed by: UROLOGY

## 2020-06-25 PROCEDURE — 76060000032 HC ANESTHESIA 0.5 TO 1 HR: Performed by: UROLOGY

## 2020-06-25 PROCEDURE — 77030020782 HC GWN BAIR PAWS FLX 3M -B: Performed by: UROLOGY

## 2020-06-25 PROCEDURE — 76210000021 HC REC RM PH II 0.5 TO 1 HR: Performed by: UROLOGY

## 2020-06-25 PROCEDURE — 74011000250 HC RX REV CODE- 250: Performed by: NURSE ANESTHETIST, CERTIFIED REGISTERED

## 2020-06-25 PROCEDURE — 77030034475 HC MISC IMPL SPN: Performed by: UROLOGY

## 2020-06-25 PROCEDURE — 74011000250 HC RX REV CODE- 250: Performed by: UROLOGY

## 2020-06-25 PROCEDURE — 76010000138 HC OR TIME 0.5 TO 1 HR: Performed by: UROLOGY

## 2020-06-25 PROCEDURE — 77030031139 HC SUT VCRL2 J&J -A: Performed by: UROLOGY

## 2020-06-25 RX ORDER — PROPOFOL 10 MG/ML
INJECTION, EMULSION INTRAVENOUS AS NEEDED
Status: DISCONTINUED | OUTPATIENT
Start: 2020-06-25 | End: 2020-06-25 | Stop reason: HOSPADM

## 2020-06-25 RX ORDER — SODIUM CHLORIDE, SODIUM LACTATE, POTASSIUM CHLORIDE, CALCIUM CHLORIDE 600; 310; 30; 20 MG/100ML; MG/100ML; MG/100ML; MG/100ML
100 INJECTION, SOLUTION INTRAVENOUS CONTINUOUS
Status: CANCELLED | OUTPATIENT
Start: 2020-06-25

## 2020-06-25 RX ORDER — FLUMAZENIL 0.1 MG/ML
0.2 INJECTION INTRAVENOUS
Status: CANCELLED | OUTPATIENT
Start: 2020-06-25

## 2020-06-25 RX ORDER — MIDAZOLAM HYDROCHLORIDE 1 MG/ML
INJECTION, SOLUTION INTRAMUSCULAR; INTRAVENOUS AS NEEDED
Status: DISCONTINUED | OUTPATIENT
Start: 2020-06-25 | End: 2020-06-25 | Stop reason: HOSPADM

## 2020-06-25 RX ORDER — ONDANSETRON 2 MG/ML
INJECTION INTRAMUSCULAR; INTRAVENOUS AS NEEDED
Status: DISCONTINUED | OUTPATIENT
Start: 2020-06-25 | End: 2020-06-25 | Stop reason: HOSPADM

## 2020-06-25 RX ORDER — NAPROXEN 500 MG/1
500 TABLET ORAL 2 TIMES DAILY WITH MEALS
COMMUNITY

## 2020-06-25 RX ORDER — NALOXONE HYDROCHLORIDE 0.4 MG/ML
0.4 INJECTION, SOLUTION INTRAMUSCULAR; INTRAVENOUS; SUBCUTANEOUS AS NEEDED
Status: CANCELLED | OUTPATIENT
Start: 2020-06-25

## 2020-06-25 RX ORDER — OXYCODONE AND ACETAMINOPHEN 5; 325 MG/1; MG/1
1 TABLET ORAL
Status: CANCELLED | OUTPATIENT
Start: 2020-06-25 | End: 2020-06-26

## 2020-06-25 RX ORDER — LIDOCAINE HYDROCHLORIDE 20 MG/ML
INJECTION, SOLUTION EPIDURAL; INFILTRATION; INTRACAUDAL; PERINEURAL AS NEEDED
Status: DISCONTINUED | OUTPATIENT
Start: 2020-06-25 | End: 2020-06-25 | Stop reason: HOSPADM

## 2020-06-25 RX ORDER — CEFAZOLIN SODIUM 2 G/50ML
2 SOLUTION INTRAVENOUS ONCE
Status: COMPLETED | OUTPATIENT
Start: 2020-06-25 | End: 2020-06-25

## 2020-06-25 RX ORDER — SODIUM CHLORIDE, SODIUM LACTATE, POTASSIUM CHLORIDE, CALCIUM CHLORIDE 600; 310; 30; 20 MG/100ML; MG/100ML; MG/100ML; MG/100ML
125 INJECTION, SOLUTION INTRAVENOUS CONTINUOUS
Status: DISCONTINUED | OUTPATIENT
Start: 2020-06-25 | End: 2020-06-25 | Stop reason: HOSPADM

## 2020-06-25 RX ORDER — ONDANSETRON 2 MG/ML
4 INJECTION INTRAMUSCULAR; INTRAVENOUS ONCE
Status: CANCELLED | OUTPATIENT
Start: 2020-06-25 | End: 2020-06-25

## 2020-06-25 RX ORDER — KETAMINE HYDROCHLORIDE 10 MG/ML
INJECTION, SOLUTION INTRAMUSCULAR; INTRAVENOUS AS NEEDED
Status: DISCONTINUED | OUTPATIENT
Start: 2020-06-25 | End: 2020-06-25 | Stop reason: HOSPADM

## 2020-06-25 RX ORDER — FENTANYL CITRATE 50 UG/ML
50 INJECTION, SOLUTION INTRAMUSCULAR; INTRAVENOUS
Status: CANCELLED | OUTPATIENT
Start: 2020-06-25

## 2020-06-25 RX ORDER — FENTANYL CITRATE 50 UG/ML
INJECTION, SOLUTION INTRAMUSCULAR; INTRAVENOUS AS NEEDED
Status: DISCONTINUED | OUTPATIENT
Start: 2020-06-25 | End: 2020-06-25 | Stop reason: HOSPADM

## 2020-06-25 RX ADMIN — PROPOFOL 50 MG: 10 INJECTION, EMULSION INTRAVENOUS at 10:58

## 2020-06-25 RX ADMIN — SODIUM CHLORIDE, SODIUM LACTATE, POTASSIUM CHLORIDE, AND CALCIUM CHLORIDE 125 ML/HR: 600; 310; 30; 20 INJECTION, SOLUTION INTRAVENOUS at 09:30

## 2020-06-25 RX ADMIN — FENTANYL CITRATE 50 MCG: 50 INJECTION, SOLUTION INTRAMUSCULAR; INTRAVENOUS at 10:43

## 2020-06-25 RX ADMIN — ONDANSETRON HYDROCHLORIDE 4 MG: 2 INJECTION INTRAMUSCULAR; INTRAVENOUS at 10:56

## 2020-06-25 RX ADMIN — LIDOCAINE HYDROCHLORIDE 80 MG: 20 INJECTION, SOLUTION EPIDURAL; INFILTRATION; INTRACAUDAL; PERINEURAL at 10:46

## 2020-06-25 RX ADMIN — PROPOFOL 20 MG: 10 INJECTION, EMULSION INTRAVENOUS at 11:06

## 2020-06-25 RX ADMIN — SODIUM CHLORIDE, SODIUM LACTATE, POTASSIUM CHLORIDE, AND CALCIUM CHLORIDE: 600; 310; 30; 20 INJECTION, SOLUTION INTRAVENOUS at 10:41

## 2020-06-25 RX ADMIN — KETAMINE HYDROCHLORIDE 20 MG: 10 INJECTION, SOLUTION INTRAMUSCULAR; INTRAVENOUS at 10:43

## 2020-06-25 RX ADMIN — CEFAZOLIN SODIUM 2 G: 2 SOLUTION INTRAVENOUS at 10:49

## 2020-06-25 RX ADMIN — KETAMINE HYDROCHLORIDE 10 MG: 10 INJECTION, SOLUTION INTRAMUSCULAR; INTRAVENOUS at 11:03

## 2020-06-25 RX ADMIN — FENTANYL CITRATE 50 MCG: 50 INJECTION, SOLUTION INTRAMUSCULAR; INTRAVENOUS at 10:47

## 2020-06-25 RX ADMIN — PROPOFOL 30 MG: 10 INJECTION, EMULSION INTRAVENOUS at 10:47

## 2020-06-25 RX ADMIN — MIDAZOLAM 2 MG: 1 INJECTION INTRAMUSCULAR; INTRAVENOUS at 10:41

## 2020-06-25 NOTE — ANESTHESIA PREPROCEDURE EVALUATION
Relevant Problems   No relevant active problems       Anesthetic History   No history of anesthetic complications            Review of Systems / Medical History  Patient summary reviewed, nursing notes reviewed and pertinent labs reviewed    Pulmonary  Within defined limits                 Neuro/Psych   Within defined limits           Cardiovascular  Within defined limits                Exercise tolerance: >4 METS     GI/Hepatic/Renal     GERD: well controlled        Pertinent negatives: No hepatitis, liver disease and renal disease  Comments: gastroparesis Endo/Other      Hypothyroidism: well controlled    Pertinent negatives: No diabetes, hyperthyroidism, obesity and blood dyscrasia   Other Findings              Physical Exam    Airway  Mallampati: III  TM Distance: 4 - 6 cm  Neck ROM: normal range of motion   Mouth opening: Normal     Cardiovascular  Regular rate and rhythm,  S1 and S2 normal,  no murmur, click, rub, or gallop             Dental  No notable dental hx       Pulmonary  Breath sounds clear to auscultation               Abdominal  GI exam deferred       Other Findings            Anesthetic Plan    ASA: 2  Anesthesia type: MAC          Induction: Intravenous  Anesthetic plan and risks discussed with: Patient

## 2020-06-25 NOTE — PERIOP NOTES
In accordance with the surgeon notification escalation protocol Carmen Costa RN was contacted and informed that pt took Naproxen 6/21/20. José Miguel Ibanez states that she will inform Dr. Yasemin Chavira and call back with any questions or concerns.

## 2020-06-25 NOTE — PERIOP NOTES
Reviewed PTA medication list with patient/caregiver and patient/caregiver denies any additional medications. Patient admits to having a responsible adult care for them at home for at least 24 hours after surgery. Patient encouraged to use gown warming system and informed that using said warming gown to regulate body temperature prior to a procedure has been shown to help reduce the risks of blood clots and infection. Dual skin assessment & fall risk band verification completed with Jefe Diehl RN.

## 2020-06-25 NOTE — INTERVAL H&P NOTE
Update History & Physical 
 
The Patient's History and Physical of May 27,  
2020 was reviewed with the patient and I examined the patient. There was a change, pt underwent for stage one Axonics implant last week. She has done very well. She is here today for second stage of the implant. .  The surgical site was confirmed by the patient and me. Plan:  The risk, benefits, expected outcome, and alternative to the recommended procedure have been discussed with the patient. Patient understands and wants to proceed with the procedure.  
 
Electronically signed by Farhana Ford MD on 6/25/2020 at 10:16 AM

## 2020-06-25 NOTE — BRIEF OP NOTE
Brief Postoperative Note    Patient: Christiano Armendariz  YOB: 1965  MRN: [id number]    Date of Procedure: 6/25/2020     Pre-Op Diagnosis: URGE INCONTINENCE    Post-Op Diagnosis: Same as preoperative diagnosis. Procedure(s):  AXONICS II STAGE IMPLANT    Surgeon(s):  Cali Bacon MD    Surgical Assistant: None    Anesthesia: MAC     Estimated Blood Loss (mL): Minimal    Complications: None    Specimens: * No specimens in log *     Implants:   Implant Name Type Inv.  Item Serial No.  Lot No. LRB No. Used Action   QVIVO NEUROSTIMULATOR    [id number]   Right 1 Implanted       Drains: * No LDAs found *    Findings: Urge incontinence    Electronically Signed by Shruthi Garcia MD on 6/25/2020 at 11:26 AM

## 2020-06-25 NOTE — ANESTHESIA POSTPROCEDURE EVALUATION
Post-Anesthesia Evaluation & Assessment    Visit Vitals  BP (!) 164/92   Pulse 68   Temp 36.1 °C (96.9 °F)   Resp 16   Ht 5' 5\" (1.651 m)   Wt 74.9 kg (165 lb 3 oz)   SpO2 100%   BMI 27.49 kg/m²       Nausea/Vomiting: Controlled. Post-operative hydration adequate. Pain Scale 1: Numeric (0 - 10) (06/25/20 1203)  Pain Intensity 1: 0 (06/25/20 1203)   Managed    Pain score at or below stated goal level. Mental status & Level of consciousness: alert and oriented x 3    Neurological status: moves all extremities, sensation grossly intact    Pulmonary status: airway patent, adequate oxygenation. Complications related to anesthesia: none    Patient has met all PACU discharge requirements.       Rhiannon Knight DO

## 2020-06-25 NOTE — OP NOTES
Postoperative Note    Patient: Travis Vivar  YOB: 1965  MRN: [id number]    Date of Procedure: 6/25/2020     Pre-Op Diagnosis: URGE INCONTINENCE    Post-Op Diagnosis: Same as preoperative diagnosis. Procedure(s):  AXONICS II STAGE IMPLANT    Surgeon(s):  Diana Jacobs MD    Surgical Assistant: None    Anesthesia: MAC     Estimated Blood Loss (mL): Minimal    Complications: None    Specimens: * No specimens in log *     Implants:   Implant Name Type Inv. Item Serial No.  Lot No. LRB No. Used Action   AXONICS NEUROSTIMULATOR    [id number]   Right 1 Implanted       Drains: * No LDAs found *    Findings: Urge incontinence    Procedure Details:  After informed consent was obtained, the patient was taken to the operating room, was placed in the prone position after IV sedation was instituted. The temporary lead connector was cut. A hemostat was placed on the temporary lead, and the patient was prepped and draped in usual surgical fashion. Local anesthetic was used to anesthetize the pocket. The pocket was then opened with a scalpel, and sharp and blunt dissection was used to open the pocket up and remove the previous sutures. The tined lead, was identified, brought into the wound. The temporary connector was opened by loosening the hex screw. and the temporary lead was cut, removing the temporary lead connector. Next, the circulating nurse removed the temporary lead by pulling the hemostat out from under the drape. The wound was copiously irrigated with antibiotic saline solution. The Axonics neurostimulator was then connected to the tined lead after it was dried off. The hex nut wrench was used to tighten down the ratcheted hex nut. The neurostimulator was placed back into the pocket with the wire hidden underneath it. The neurostimulator was interrogated, appropriate function was noted.    The wound was then closed in two layers; a 3-0 Vicryl running suture for the Cassie layer, and a 4-0 Vicryl for the subcuticular layer. The wound was then washed off and dried. Dermabond was placed over the incisions, and the procedure ended. The patient was placed in the supine position on the stretcher, awakened from IV sedation, and transferred to the recovery room awake, alert, and in stable condition.

## 2020-06-25 NOTE — DISCHARGE INSTRUCTIONS
Patient Education        9 Things To Do If You've Been Exposed to COVID-19    Stay home. If you've been exposed, you should stay in isolation for 14 days. Don't go to school, work, or public areas. And don't use public transportation, ride-shares, or taxis unless you have no choice. Leave your home only if you need to get medical care. But call the doctor's office first so they know you're coming, and wear a cloth face cover when you go. Call your doctor. Call your doctor or other health professional to let them know that you've been exposed. They might want you to be tested, or they may have other instructions for you. If you become sick, wear a face cover when you are around other people. It can help stop the spread of the virus when you cough or sneeze. Limit contact with people in your home. If possible, stay in a separate bedroom and use a separate bathroom. Avoid contact with pets and other animals. Cover your mouth and nose with a tissue when you cough or sneeze. Then throw it in the trash right away. Wash your hands often, especially after you cough or sneeze. Use soap and water, and scrub for at least 20 seconds. If soap and water aren't available, use an alcohol-based hand . Don't share personal household items. These include bedding, towels, cups and glasses, and eating utensils. Clean and disinfect your home every day. Use household  or disinfectant wipes or sprays. Take special care to clean things that you grab with your hands. These include doorknobs, remote controls, phones, and handles on your refrigerator and microwave. And don't forget countertops, tabletops, bathrooms, and computer keyboards. Current as of: May 8, 2020               Content Version: 12.5  © 2006-2020 [employer]. Care instructions adapted under license by [employer] (which disclaims liability or warranty for this information).  If you have questions about a medical condition or this instruction, always ask your healthcare professional. [address] any warranty or liability for your use of this information. DISCHARGE SUMMARY from Nurse    PATIENT INSTRUCTIONS:    After general anesthesia or intravenous sedation, for 24 hours or while taking prescription Narcotics:  · Limit your activities  · Do not drive and operate hazardous machinery  · Do not make important personal or business decisions  · Do  not drink alcoholic beverages  · If you have not urinated within 8 hours after discharge, please contact your surgeon on call. Report the following to your surgeon:  · Excessive pain, swelling, redness or odor of or around the surgical area  · Temperature over 100.5  · Nausea and vomiting lasting longer than 4 hours or if unable to take medications  · Any signs of decreased circulation or nerve impairment to extremity: change in color, persistent  numbness, tingling, coldness or increase pain  · Any questions    What to do at Home:  REGULAR DIET [address] UP    If you experience any of the following symptoms heavy bleeding, unable to void, fevers, severe pain, please follow up with dr Jocelin Bolton     *  Please give a list of your current medications to your Primary Care Provider. *  Please update this list whenever your medications are discontinued, doses are      changed, or new medications (including over-the-counter products) are added. *  Please carry medication information at all times in case of emergency situations. These are general instructions for a healthy lifestyle:    No smoking/ No tobacco products/ Avoid exposure to second hand smoke  Surgeon General's Warning:  Quitting smoking now greatly reduces serious risk to your health.     Obesity, smoking, and sedentary lifestyle greatly increases your risk for illness    A healthy diet, regular physical exercise & weight monitoring are important for maintaining a healthy lifestyle    You may be retaining fluid if you have a history of heart failure or if you experience any of the following symptoms:  Weight gain of 3 pounds or more overnight or 5 pounds in a week, increased swelling in our hands or feet or shortness of breath while lying flat in bed. Please call your doctor as soon as you notice any of these symptoms; do not wait until your next office visit. The discharge information has been reviewed with the patient and caregiver. The patient and caregiver verbalized understanding. Discharge medications reviewed with the patient and caregiver and appropriate educational materials and side effects teaching were provided. ___________________________________________________________________________________________________________________________________    Daniella Haro M.D. [facility]  [address], [address] [address] , [address]  Office: [phone]  Fax:    [phone]: Procedure(s):  1 [facility] IMMEDIATELY if any of the following occur:     You are unable to urinate. Urgency to urinate is not uncommon.  You find yourself urinating small frequent amounts associated with severe lower abdominal discomfort.  Bright red blood with clots in the urine. Some reddish urine is not uncommon and should be treated with increasing the amount of fluids you drink.  Temperature above 101.5° and / or chills.  You are nauseous and / or vomiting and you cannot hold down any fluids.  Your pain is not controlled with the pain medication prescribed. Special Considerations:      Do not drive for at least 24 hours after the procedure and until you are no longer taking narcotic pain medication and you are able to move and react without hesitation.       MEDICATIONS:  Pain   []  Norco®   []  Percocet® []  Dilaudid®    []  Tramadol Antibiotics   []  Cipro   [x]  Keflex    [] Levaquin   []  Bactrim DS®       Urination   []  Vesicare®   []  Flomax     Burning   []  Pyridium®   []  UribelTM     Nausea   []  Zofran®   []  Phenergan®     Miscellaneous   [x]  Diflucan x 2         [] Prescriptions Written on Chart    [x] Prescriptions sent Electronically           Our office will call you tomorrow to schedule your first follow-up appointment. Please contact [facility]. Urology at [phone] or go to the nearest Emergency Department / Urgent Care facility for any other medical questions or concerns.     Patient armband removed and shredded

## 2021-05-27 ENCOUNTER — HOSPITAL ENCOUNTER (OUTPATIENT)
Dept: LAB | Age: 56
Discharge: HOME | End: 2021-05-27
Payer: COMMERCIAL

## 2021-05-27 LAB
ANION GAP SERPL CALC-SCNC: 5 MMOL/L (ref 3–18)
BUN SERPL-MCNC: 17 MG/DL (ref 7–18)
BUN/CREAT SERPL: 24 (ref 12–20)
CALCIUM SERPL-MCNC: 9.1 MG/DL (ref 8.5–10.1)
CHLORIDE SERPL-SCNC: 110 MMOL/L (ref 100–111)
CO2 SERPL-SCNC: 28 MMOL/L (ref 21–32)
CREAT SERPL-MCNC: 0.72 MG/DL (ref 0.6–1.3)
GLUCOSE SERPL-MCNC: 89 MG/DL (ref 74–99)
HCT VFR BLD AUTO: 37.3 % (ref 35–45)
HGB BLD-MCNC: 11.7 G/DL (ref 12–16)
POTASSIUM SERPL-SCNC: 3.6 MMOL/L (ref 3.5–5.5)
SODIUM SERPL-SCNC: 143 MMOL/L (ref 136–145)

## 2021-05-27 PROCEDURE — 85018 HEMOGLOBIN: CPT

## 2021-05-27 PROCEDURE — 80048 BASIC METABOLIC PNL TOTAL CA: CPT

## 2021-05-27 PROCEDURE — 36415 COLL VENOUS BLD VENIPUNCTURE: CPT

## 2024-08-19 ENCOUNTER — HOSPITAL ENCOUNTER (OUTPATIENT)
Facility: HOSPITAL | Age: 59
Discharge: HOME | End: 2024-08-21
Payer: COMMERCIAL

## 2024-08-19 ENCOUNTER — HOSPITAL ENCOUNTER (OUTPATIENT)
Facility: HOSPITAL | Age: 59
Discharge: HOME | End: 2024-08-22
Payer: COMMERCIAL

## 2024-08-19 LAB
ANION GAP SERPL CALC-SCNC: 7 MMOL/L (ref 3–18)
BUN SERPL-MCNC: 16 MG/DL (ref 7–18)
BUN/CREAT SERPL: 19 (ref 12–20)
CALCIUM SERPL-MCNC: 9.6 MG/DL (ref 8.5–10.1)
CHLORIDE SERPL-SCNC: 107 MMOL/L (ref 100–111)
CO2 SERPL-SCNC: 27 MMOL/L (ref 21–32)
CREAT SERPL-MCNC: 0.84 MG/DL (ref 0.6–1.3)
EKG ATRIAL RATE: 75 BPM
EKG DIAGNOSIS: NORMAL
EKG P AXIS: 40 DEGREES
EKG P-R INTERVAL: 152 MS
EKG Q-T INTERVAL: 402 MS
EKG QRS DURATION: 82 MS
EKG QTC CALCULATION (BAZETT): 448 MS
EKG R AXIS: 82 DEGREES
EKG T AXIS: 38 DEGREES
EKG VENTRICULAR RATE: 75 BPM
GLUCOSE SERPL-MCNC: 95 MG/DL (ref 74–99)
HCT VFR BLD AUTO: 37.7 % (ref 35–45)
HGB BLD-MCNC: 12.3 G/DL (ref 12–16)
POTASSIUM SERPL-SCNC: 3.8 MMOL/L (ref 3.5–5.5)
SODIUM SERPL-SCNC: 141 MMOL/L (ref 136–145)

## 2024-08-19 PROCEDURE — 80048 BASIC METABOLIC PNL TOTAL CA: CPT

## 2024-08-19 PROCEDURE — 93005 ELECTROCARDIOGRAM TRACING: CPT | Performed by: PODIATRIST

## 2024-08-19 PROCEDURE — 85018 HEMOGLOBIN: CPT

## 2024-08-19 PROCEDURE — 93010 ELECTROCARDIOGRAM REPORT: CPT | Performed by: INTERNAL MEDICINE

## 2024-08-19 PROCEDURE — 36415 COLL VENOUS BLD VENIPUNCTURE: CPT

## 2024-08-19 PROCEDURE — 85014 HEMATOCRIT: CPT

## 2024-09-27 ENCOUNTER — HOSPITAL ENCOUNTER (OUTPATIENT)
Facility: HOSPITAL | Age: 59
Discharge: HOME | End: 2024-09-30
Attending: PODIATRIST
Payer: COMMERCIAL

## 2024-09-27 DIAGNOSIS — S92.352A CLOSED DISPLACED FRACTURE OF FIFTH METATARSAL BONE OF LEFT FOOT, INITIAL ENCOUNTER: ICD-10-CM

## 2024-09-27 PROCEDURE — 73700 CT LOWER EXTREMITY W/O DYE: CPT

## 2025-06-02 ENCOUNTER — HOSPITAL ENCOUNTER (OUTPATIENT)
Facility: HOSPITAL | Age: 60
Discharge: HOME | End: 2025-06-05
Payer: COMMERCIAL

## 2025-06-02 LAB
ALBUMIN SERPL-MCNC: 4.1 G/DL (ref 3.4–5)
ALBUMIN/GLOB SERPL: 1.2 (ref 0.8–1.7)
ALP SERPL-CCNC: 124 U/L (ref 45–117)
ALT SERPL-CCNC: 29 U/L (ref 10–35)
ANION GAP SERPL CALC-SCNC: 12 MMOL/L (ref 3–18)
AST SERPL-CCNC: 33 U/L (ref 10–38)
BILIRUB SERPL-MCNC: 0.5 MG/DL (ref 0.2–1)
BUN SERPL-MCNC: 13 MG/DL (ref 6–23)
BUN/CREAT SERPL: 16 (ref 12–20)
CALCIUM SERPL-MCNC: 10 MG/DL (ref 8.5–10.1)
CHLORIDE SERPL-SCNC: 105 MMOL/L (ref 98–107)
CO2 SERPL-SCNC: 25 MMOL/L (ref 21–32)
CREAT SERPL-MCNC: 0.78 MG/DL (ref 0.6–1.3)
GLOBULIN SER CALC-MCNC: 3.3 G/DL (ref 2–4)
GLUCOSE SERPL-MCNC: 91 MG/DL (ref 74–108)
POTASSIUM SERPL-SCNC: 4.2 MMOL/L (ref 3.5–5.5)
PROT SERPL-MCNC: 7.3 G/DL (ref 6.4–8.2)
SODIUM SERPL-SCNC: 142 MMOL/L (ref 136–145)

## 2025-06-02 PROCEDURE — 36415 COLL VENOUS BLD VENIPUNCTURE: CPT

## 2025-06-02 PROCEDURE — 80053 COMPREHEN METABOLIC PANEL: CPT

## 2025-06-03 LAB
FAX TO NUMBER: NORMAL
TEST RESULTS FAXED TO: NORMAL

## (undated) DEVICE — INTENDED FOR TISSUE SEPARATION, AND OTHER PROCEDURES THAT REQUIRE A SHARP SURGICAL BLADE TO PUNCTURE OR CUT.: Brand: BARD-PARKER ® CARBON RIB-BACK BLADES

## (undated) DEVICE — SOLUTION IRRIG 1500ML 0.9% SOD CHL USP POUR PLAS BTL

## (undated) DEVICE — SPONGE GZ W4XL4IN COT 12 PLY TYP VII WVN C FLD DSGN

## (undated) DEVICE — MINOR: Brand: MEDLINE INDUSTRIES, INC.

## (undated) DEVICE — SUTURE COAT VCRL SZ 4-0 L18IN ABSRB UD L19MM PS-2 1/2 CIR J496G

## (undated) DEVICE — 3M™ TEGADERM™ TRANSPARENT FILM DRESSING FRAME STYLE, 1626W, 4 IN X 4-3/4 IN (10 CM X 12 CM), 50/CT 4CT/CASE: Brand: 3M™ TEGADERM™

## (undated) DEVICE — AXONICS LEAD IMPLANT KIT

## (undated) DEVICE — STRIP,CLOSURE,WOUND,MEDI-STRIP,1/2X4: Brand: MEDLINE

## (undated) DEVICE — C-ARMOR C-ARM EQUIPMENT COVERS CLEAR STERILE UNIVERSAL FIT 12 PER CASE: Brand: C-ARMOR

## (undated) DEVICE — MASTISOL ADHESIVE LIQ 2/3ML

## (undated) DEVICE — STERILE POLYISOPRENE POWDER-FREE SURGICAL GLOVES: Brand: PROTEXIS

## (undated) DEVICE — 3-0 COATED VICRYL PLUS UNDYED 1X27" SH --

## (undated) DEVICE — 3M™ STERI-DRAPE™ INCISE DRAPE 1050 (60CM X 45CM): Brand: STERI-DRAPE™

## (undated) DEVICE — SUT CHRMC 4-0 27IN RB1 BRN --

## (undated) DEVICE — 1010 S-DRAPE TOWEL DRAPE 10/BX: Brand: STERI-DRAPE™

## (undated) DEVICE — GARMENT,MEDLINE,DVT,INT,CALF,MED, GEN2: Brand: MEDLINE

## (undated) DEVICE — DRAPE XR C ARM 41X74IN LF --

## (undated) DEVICE — SYR 10ML CTRL LR LCK NSAF LF --